# Patient Record
Sex: FEMALE | Race: WHITE | NOT HISPANIC OR LATINO | Employment: UNEMPLOYED | URBAN - METROPOLITAN AREA
[De-identification: names, ages, dates, MRNs, and addresses within clinical notes are randomized per-mention and may not be internally consistent; named-entity substitution may affect disease eponyms.]

---

## 2021-05-27 ENCOUNTER — RECORDS - HEALTHEAST (OUTPATIENT)
Dept: ADMINISTRATIVE | Facility: CLINIC | Age: 61
End: 2021-05-27

## 2021-05-29 ENCOUNTER — RECORDS - HEALTHEAST (OUTPATIENT)
Dept: ADMINISTRATIVE | Facility: CLINIC | Age: 61
End: 2021-05-29

## 2022-10-18 ENCOUNTER — APPOINTMENT (OUTPATIENT)
Dept: CT IMAGING | Facility: HOSPITAL | Age: 62
End: 2022-10-18
Attending: EMERGENCY MEDICINE
Payer: COMMERCIAL

## 2022-10-18 ENCOUNTER — HOSPITAL ENCOUNTER (EMERGENCY)
Facility: HOSPITAL | Age: 62
Discharge: HOME | End: 2022-10-18
Attending: EMERGENCY MEDICINE | Admitting: EMERGENCY MEDICINE
Payer: COMMERCIAL

## 2022-10-18 VITALS
BODY MASS INDEX: 18.73 KG/M2 | DIASTOLIC BLOOD PRESSURE: 64 MMHG | OXYGEN SATURATION: 99 % | TEMPERATURE: 97.9 F | HEIGHT: 63 IN | WEIGHT: 105.7 LBS | RESPIRATION RATE: 18 BRPM | HEART RATE: 70 BPM | SYSTOLIC BLOOD PRESSURE: 140 MMHG

## 2022-10-18 DIAGNOSIS — E87.6 HYPOKALEMIA: ICD-10-CM

## 2022-10-18 DIAGNOSIS — K62.89 PROCTITIS: ICD-10-CM

## 2022-10-18 PROBLEM — F19.11 OTHER, MIXED, OR UNSPECIFIED NONDEPENDENT DRUG ABUSE, IN REMISSION: Status: ACTIVE | Noted: 2022-10-18

## 2022-10-18 LAB
ALBUMIN SERPL BCG-MCNC: 4.8 G/DL (ref 3.5–5.2)
ALP SERPL-CCNC: 113 U/L (ref 35–104)
ALT SERPL W P-5'-P-CCNC: 35 U/L (ref 10–35)
ANION GAP SERPL CALCULATED.3IONS-SCNC: 16 MMOL/L (ref 7–15)
AST SERPL W P-5'-P-CCNC: 48 U/L (ref 10–35)
BASOPHILS # BLD AUTO: 0.1 10E3/UL (ref 0–0.2)
BASOPHILS NFR BLD AUTO: 2 %
BILIRUB DIRECT SERPL-MCNC: <0.2 MG/DL (ref 0–0.3)
BILIRUB SERPL-MCNC: 0.6 MG/DL
BUN SERPL-MCNC: 10.4 MG/DL (ref 8–23)
C DIFF TOX B STL QL: NEGATIVE
CALCIUM SERPL-MCNC: 9.9 MG/DL (ref 8.8–10.2)
CHLORIDE SERPL-SCNC: 104 MMOL/L (ref 98–107)
CREAT SERPL-MCNC: 0.64 MG/DL (ref 0.51–0.95)
CRP SERPL-MCNC: 5.5 MG/L
DEPRECATED HCO3 PLAS-SCNC: 17 MMOL/L (ref 22–29)
EOSINOPHIL # BLD AUTO: 0.2 10E3/UL (ref 0–0.7)
EOSINOPHIL NFR BLD AUTO: 3 %
ERYTHROCYTE [DISTWIDTH] IN BLOOD BY AUTOMATED COUNT: 14.2 % (ref 10–15)
GFR SERPL CREATININE-BSD FRML MDRD: >90 ML/MIN/1.73M2
GLUCOSE SERPL-MCNC: 153 MG/DL (ref 70–99)
HCT VFR BLD AUTO: 43 % (ref 35–47)
HGB BLD-MCNC: 15.6 G/DL (ref 11.7–15.7)
IMM GRANULOCYTES # BLD: 0 10E3/UL
IMM GRANULOCYTES NFR BLD: 0 %
LIPASE SERPL-CCNC: 44 U/L (ref 13–60)
LYMPHOCYTES # BLD AUTO: 1.3 10E3/UL (ref 0.8–5.3)
LYMPHOCYTES NFR BLD AUTO: 18 %
MAGNESIUM SERPL-MCNC: 1.9 MG/DL (ref 1.7–2.3)
MCH RBC QN AUTO: 31.8 PG (ref 26.5–33)
MCHC RBC AUTO-ENTMCNC: 36.3 G/DL (ref 31.5–36.5)
MCV RBC AUTO: 88 FL (ref 78–100)
MONOCYTES # BLD AUTO: 0.6 10E3/UL (ref 0–1.3)
MONOCYTES NFR BLD AUTO: 9 %
NEUTROPHILS # BLD AUTO: 4.6 10E3/UL (ref 1.6–8.3)
NEUTROPHILS NFR BLD AUTO: 68 %
NRBC # BLD AUTO: 0 10E3/UL
NRBC BLD AUTO-RTO: 0 /100
PLATELET # BLD AUTO: 355 10E3/UL (ref 150–450)
POTASSIUM SERPL-SCNC: 3.2 MMOL/L (ref 3.4–5.3)
PROT SERPL-MCNC: 7.7 G/DL (ref 6.4–8.3)
RBC # BLD AUTO: 4.91 10E6/UL (ref 3.8–5.2)
SODIUM SERPL-SCNC: 137 MMOL/L (ref 136–145)
WBC # BLD AUTO: 6.8 10E3/UL (ref 4–11)

## 2022-10-18 PROCEDURE — 250N000011 HC RX IP 250 OP 636: Performed by: EMERGENCY MEDICINE

## 2022-10-18 PROCEDURE — 83735 ASSAY OF MAGNESIUM: CPT | Performed by: EMERGENCY MEDICINE

## 2022-10-18 PROCEDURE — 83690 ASSAY OF LIPASE: CPT | Performed by: EMERGENCY MEDICINE

## 2022-10-18 PROCEDURE — 99285 EMERGENCY DEPT VISIT HI MDM: CPT | Mod: 25

## 2022-10-18 PROCEDURE — 96375 TX/PRO/DX INJ NEW DRUG ADDON: CPT

## 2022-10-18 PROCEDURE — 96361 HYDRATE IV INFUSION ADD-ON: CPT

## 2022-10-18 PROCEDURE — 82248 BILIRUBIN DIRECT: CPT | Performed by: EMERGENCY MEDICINE

## 2022-10-18 PROCEDURE — 36415 COLL VENOUS BLD VENIPUNCTURE: CPT | Performed by: EMERGENCY MEDICINE

## 2022-10-18 PROCEDURE — 74177 CT ABD & PELVIS W/CONTRAST: CPT

## 2022-10-18 PROCEDURE — 85025 COMPLETE CBC W/AUTO DIFF WBC: CPT | Performed by: EMERGENCY MEDICINE

## 2022-10-18 PROCEDURE — 258N000003 HC RX IP 258 OP 636: Performed by: EMERGENCY MEDICINE

## 2022-10-18 PROCEDURE — 96374 THER/PROPH/DIAG INJ IV PUSH: CPT

## 2022-10-18 PROCEDURE — 87493 C DIFF AMPLIFIED PROBE: CPT | Performed by: EMERGENCY MEDICINE

## 2022-10-18 PROCEDURE — 86140 C-REACTIVE PROTEIN: CPT | Performed by: EMERGENCY MEDICINE

## 2022-10-18 PROCEDURE — 250N000013 HC RX MED GY IP 250 OP 250 PS 637: Performed by: EMERGENCY MEDICINE

## 2022-10-18 RX ORDER — ONDANSETRON 2 MG/ML
4 INJECTION INTRAMUSCULAR; INTRAVENOUS ONCE
Status: COMPLETED | OUTPATIENT
Start: 2022-10-18 | End: 2022-10-18

## 2022-10-18 RX ORDER — ONDANSETRON 4 MG/1
4 TABLET, ORALLY DISINTEGRATING ORAL EVERY 8 HOURS PRN
Qty: 10 TABLET | Refills: 0 | Status: SHIPPED | OUTPATIENT
Start: 2022-10-18 | End: 2022-10-21

## 2022-10-18 RX ORDER — IOPAMIDOL 755 MG/ML
100 INJECTION, SOLUTION INTRAVASCULAR ONCE
Status: COMPLETED | OUTPATIENT
Start: 2022-10-18 | End: 2022-10-18

## 2022-10-18 RX ORDER — KETOROLAC TROMETHAMINE 15 MG/ML
15 INJECTION, SOLUTION INTRAMUSCULAR; INTRAVENOUS ONCE
Status: COMPLETED | OUTPATIENT
Start: 2022-10-18 | End: 2022-10-18

## 2022-10-18 RX ORDER — POTASSIUM CHLORIDE 1.5 G/1.58G
40 POWDER, FOR SOLUTION ORAL ONCE
Status: COMPLETED | OUTPATIENT
Start: 2022-10-18 | End: 2022-10-18

## 2022-10-18 RX ADMIN — ONDANSETRON 4 MG: 2 INJECTION INTRAMUSCULAR; INTRAVENOUS at 11:51

## 2022-10-18 RX ADMIN — IOPAMIDOL 100 ML: 755 INJECTION, SOLUTION INTRAVENOUS at 13:36

## 2022-10-18 RX ADMIN — SODIUM CHLORIDE, POTASSIUM CHLORIDE, SODIUM LACTATE AND CALCIUM CHLORIDE 1000 ML: 600; 310; 30; 20 INJECTION, SOLUTION INTRAVENOUS at 11:49

## 2022-10-18 RX ADMIN — POTASSIUM CHLORIDE 40 MEQ: 1.5 POWDER, FOR SOLUTION ORAL at 14:13

## 2022-10-18 RX ADMIN — KETOROLAC TROMETHAMINE 15 MG: 15 INJECTION, SOLUTION INTRAMUSCULAR; INTRAVENOUS at 11:52

## 2022-10-18 ASSESSMENT — ENCOUNTER SYMPTOMS
DYSURIA: 0
ABDOMINAL PAIN: 1
DIARRHEA: 1
NAUSEA: 1
WOUND: 0
FATIGUE: 1
BACK PAIN: 1
WEAKNESS: 1
BRUISES/BLEEDS EASILY: 0
VOMITING: 0
CONFUSION: 0

## 2022-10-18 ASSESSMENT — ACTIVITIES OF DAILY LIVING (ADL): ADLS_ACUITY_SCORE: 33

## 2022-10-18 NOTE — ED NOTES
"ED Triage Provider Note  [facility]  Encounter Date: Oct 18, 2022      The patient was seen in triage to expedite ED workup.     History:  No chief complaint on file.    Khushboo Kerr is a 62 year old female with history of Crohn's (on mesalamine) presenting for evaluation of 2 weeks of bloodless diarrhea. Told by GI clinic to present. Reports lower abdominal pain as well; currently 6/10. States she does not like taking steroids because \"they put me into a bad depression\".    Review of Systems:  - Positive for diarrhea, nausea, lower abdominal pain  - Negative for vomiting, chest pain, shortness of breath    Vitals:  There were no vitals taken for this visit.    Brief Exam:  steady unaccompanied gait, no distress, normal work of breathing, dry mucous membranes, mild lower abdominal tenderness with no rebound or guarding, no peritoneal signs    Medical Decision Making:  Patient arriving to the ED with problem as above. A medical screening exam was performed. Orders initiated from triage to expedite ED workup.    Appears dehydrated. Will obtain CT given pain/tenderness along with typical blood work. Will give IVF, Zofran, Toradol in the meantime. Patient comfortable with this plan; no further questions at this time.    The patient is appropriate to wait in triage until ED room available.      Orders Placed This Encounter   Procedures     Basic metabolic panel     Hepatic function panel     Magnesium     CRP inflammation     Lipase     Peripheral IV catheter     CBC with platelets differential           Pepito Vazquez MD  10/18/22  Emergency Medicine  [facility] EMERGENCY DEPARTMENT  [address] [place] [id number] [phone]  Dept: [phone]     Pepito Vazquez MD  10/18/22 1122    "

## 2022-10-18 NOTE — ED TRIAGE NOTES
Has Crohn's and has been having diarrhea in the last 2 mos and worsein the last week.  No blood in stool.  Nausea     Triage Assessment     Row Name 10/18/22 1119       Triage Assessment (Adult)    Airway WDL WDL       Respiratory WDL    Respiratory WDL WDL       Skin Circulation/Temperature WDL    Skin Circulation/Temperature WDL WDL       Cognitive/Neuro/Behavioral WDL    Cognitive/Neuro/Behavioral WDL WDL

## 2022-10-18 NOTE — ED PROVIDER NOTES
EMERGENCY DEPARTMENT ENCOUNTER      NAME: Khushboo Kerr  AGE: 62 year old female  YOB: 1960  MRN: [id number]  EVALUATION DATE & TIME: 10/18/2022  2:48 PM    PCP: [facility]    ED PROVIDER: Mk Beltran MD        Chief Complaint   Patient presents with     Diarrhea     Nausea         FINAL IMPRESSION:  1. Proctitis    2. Hypokalemia          ED COURSE & MEDICAL DECISION MAKING:    Pertinent Labs & Imaging studies reviewed. (See chart for details)  62 year old female presents to the Emergency Department for evaluation of 2 months of diarrhea    Medical Decision Making    Supplemental history from: Caregiver    External Record(s) Reviewed: Outpatient Record    Differential Diagnosis: See MDM charting for differential considered.     I performed an independent interpretation of the: N/A    Discussed with radiology regarding test interpretation: N/A    Discussion of management with another provider: Gastroenterology    The following testing was considered but ultimately not selected: None    I considered prescription management with: Antibiotic    The patient's care impacted: None    Consideration of Admission/Observation: Admission/Observation considered but ultimately discharged: pt feeling better and will call her GI tomorow.    Care significantly affected by Social Determinants of Health including: N/A    ED Course as of 10/18/22 1613   Tue Oct 18, 2022   1551 I spoke with Dr. Saurav AMANDA and she wants a C Diff test 1st either in ER or at [facility] tomorrow.    1609 Patient presents with 2 months of diarrhea.  Differential includes C. difficile, colitis, food intolerances, other infectious or inflammatory causes of colitis   1610 Patient was in touch with her GI doctor today and her GI doctor ordered stool testing but patient decided to come to the ER because she was feeling worse   1610 Given Toradol and IV fluids in triage and is now feeling better.  Elevated CRP with normal white blood cell count  "  1610 No guarding or rigidity on examination or pain out of portion to exam to suggest ischemic colitis   1610 CT shows evidence of proctitis   1610 Discussed with patient's GI doctor and patient wants a C. difficile test done prior to starting steroids   1610 Patient will call her GI doctor tomorrow regarding her C. difficile testing and they can talk about treatment options for either C. difficile or other colitis   1611 We will send patient home on Zofran for nausea.  Offered oxycodone for pain which patient is declining   1611 Patient does not want to be admitted to the hospital   1611 Potassium(!): 3.2  Given oral potassium   1611 Anion Gap(!): 16   1611 Carbon Dioxide (CO2)(!): 17  Consistent with diarrheal illness   1611 Magnesium: 1.9       3:33 PM I met the patient and performed my initial interview and exam.    3:52 PM I spoke with Dr. Mg, [facility].   4:05 PM I spoke about plan for discharge.     At the conclusion of the encounter I discussed the results of all of the tests and the disposition. The questions were answered. The patient or family acknowledged understanding and was agreeable with the care plan.           MEDICATIONS GIVEN IN THE EMERGENCY:  Medications   lactated ringers BOLUS 1,000 mL (0 mLs Intravenous Stopped 10/18/22 1311)   ondansetron (ZOFRAN) injection 4 mg (4 mg Intravenous Given 10/18/22 1151)   ketorolac (TORADOL) injection 15 mg (15 mg Intravenous Given 10/18/22 1152)   potassium chloride (KLOR-CON) Packet 40 mEq (40 mEq Oral Given 10/18/22 1413)   iopamidol (ISOVUE-370) solution 100 mL (100 mLs Intravenous Given 10/18/22 1336)       NEW PRESCRIPTIONS STARTED AT TODAY'S ER VISIT  New Prescriptions    ONDANSETRON (ZOFRAN ODT) 4 MG ODT TAB    Take 1 tablet (4 mg) by mouth every 8 hours as needed for nausea          =================================================================    HPI    Triage note  \"  Has Crohn's and has been having diarrhea in the last 2 mos and " "worsein the last week.  No blood in stool.  Nausea     Triage Assessment     Row Name 10/18/22 1119       Triage Assessment (Adult)    Airway WDL WDL       Respiratory WDL    Respiratory WDL WDL       Skin Circulation/Temperature WDL    Skin Circulation/Temperature WDL WDL       Cognitive/Neuro/Behavioral WDL    Cognitive/Neuro/Behavioral WDL WDL              \"      Patient information was obtained from: Patient     Use of : N/A       Khushboo Kerr is a 62 year old female with a pertinent history of adenomatous polyp of colon, lymphocytic colitis, and regional enteritis who presents to this ED by walk in for evaluation of diarrhea and nausea.    Patient reports that she has had abdominal cramping, diarrhea, nausea, thigh and back pain for the last two months. In the last 3-4 days she has lost 10 pounds prompting an ED visit. Patient has felt shaky, weak, and fatigued today. Has had dry heaves but no vomiting. Patient does have a history of lymphocytic colitis and follows with [facility] for this. Patient takes Celexa currently and other diarrhea medications. She does not taken any anti nausea medication. Patient denies any other complaints at this time.       REVIEW OF SYSTEMS   Review of Systems   Constitutional: Positive for fatigue.   HENT: Negative for drooling.    Gastrointestinal: Positive for abdominal pain, diarrhea and nausea. Negative for vomiting.   Endocrine: Negative for polyuria.   Genitourinary: Negative for dysuria.   Musculoskeletal: Positive for back pain (with thigh pain).   Skin: Negative for wound.   Neurological: Positive for weakness (generalized).   Hematological: Does not bruise/bleed easily.   Psychiatric/Behavioral: Negative for confusion.        PAST MEDICAL HISTORY:  History reviewed. No pertinent past medical history.    PAST SURGICAL HISTORY:  History reviewed. No pertinent surgical history.        CURRENT MEDICATIONS:    ondansetron (ZOFRAN ODT) 4 MG ODT " "tab        ALLERGIES:  Allergies   Allergen Reactions     Cephalosporins Unknown     itching     Haemophilus Influenzae Swelling     swelling at the site.     Ceftin [Cefuroxime] Rash     Sulfa (Sulfonamide Antibiotics) [Sulfa Drugs] Rash       FAMILY HISTORY:  No family history on file.    SOCIAL HISTORY:   Social History     Socioeconomic History     Marital status:    Social History Narrative    Patient is        VITALS:  BP (!) 140/64   Pulse 70   Temp 97.9  F (36.6  C) (Oral)   Resp 18   Ht 1.6 m (5' 3\")   Wt 47.9 kg (105 lb 11.2 oz)   SpO2 99%   BMI 18.72 kg/m      PHYSICAL EXAM      Vitals: BP (!) 140/64   Pulse 70   Temp 97.9  F (36.6  C) (Oral)   Resp 18   Ht 1.6 m (5' 3\")   Wt 47.9 kg (105 lb 11.2 oz)   SpO2 99%   BMI 18.72 kg/m    General: Appears in no acute distress, awake, alert, interactive. Slightly irritable.   Eyes: Conjunctivae non-injected. Sclera anicteric.  HENT: Atraumatic.  Neck: Supple.  Respiratory/Chest: Respiration unlabored.  Abdomen: non distended. No abdominal tenderness. Normal bowel sounds.   Musculoskeletal: Normal extremities. No edema or erythema.  Skin: Normal color. No rash or diaphoresis.  Neurologic: Face symmetric, moves all extremities spontaneously. Speech clear.  Psychiatric: Oriented to person, place, and time. Affect appropriate.       LAB:  All pertinent labs reviewed and interpreted.  Results for orders placed or performed during the hospital encounter of 10/18/22   CT Abdomen Pelvis w Contrast    Impression    IMPRESSION:   1.  Mild mural hyperenhancement of the rectum suggesting infectious or inflammatory proctitis. Increased fluid throughout the colon indicating diarrhea.    2.  No small bowel active inflammation.    3.  No evidence of bowel obstruction.   Basic metabolic panel   Result Value Ref Range    Sodium 137 136 - 145 mmol/L    Potassium 3.2 (L) 3.4 - 5.3 mmol/L    Chloride 104 98 - 107 mmol/L    Carbon Dioxide (CO2) 17 (L) 22 - " 29 mmol/L    Anion Gap 16 (H) 7 - 15 mmol/L    Urea Nitrogen 10.4 8.0 - 23.0 mg/dL    Creatinine 0.64 0.51 - 0.95 mg/dL    Calcium 9.9 8.8 - 10.2 mg/dL    Glucose 153 (H) 70 - 99 mg/dL    GFR Estimate >90 >60 mL/min/1.73m2   Hepatic function panel   Result Value Ref Range    Protein Total 7.7 6.4 - 8.3 g/dL    Albumin 4.8 3.5 - 5.2 g/dL    Bilirubin Total 0.6 <=1.2 mg/dL    Alkaline Phosphatase 113 (H) 35 - 104 U/L    AST 48 (H) 10 - 35 U/L    ALT 35 10 - 35 U/L    Bilirubin Direct <0.20 0.00 - 0.30 mg/dL   Result Value Ref Range    Magnesium 1.9 1.7 - 2.3 mg/dL   Result Value Ref Range    CRP Inflammation 5.50 (H) <5.00 mg/L   Result Value Ref Range    Lipase 44 13 - 60 U/L   CBC with platelets and differential   Result Value Ref Range    WBC Count 6.8 4.0 - 11.0 10e3/uL    RBC Count 4.91 3.80 - 5.20 10e6/uL    Hemoglobin 15.6 11.7 - 15.7 g/dL    Hematocrit 43.0 35.0 - 47.0 %    MCV 88 78 - 100 fL    MCH 31.8 26.5 - 33.0 pg    MCHC 36.3 31.5 - 36.5 g/dL    RDW 14.2 10.0 - 15.0 %    Platelet Count 355 150 - 450 10e3/uL    % Neutrophils 68 %    % Lymphocytes 18 %    % Monocytes 9 %    % Eosinophils 3 %    % Basophils 2 %    % Immature Granulocytes 0 %    NRBCs per 100 WBC 0 <1 /100    Absolute Neutrophils 4.6 1.6 - 8.3 10e3/uL    Absolute Lymphocytes 1.3 0.8 - 5.3 10e3/uL    Absolute Monocytes 0.6 0.0 - 1.3 10e3/uL    Absolute Eosinophils 0.2 0.0 - 0.7 10e3/uL    Absolute Basophils 0.1 0.0 - 0.2 10e3/uL    Absolute Immature Granulocytes 0.0 <=0.4 10e3/uL    Absolute NRBCs 0.0 10e3/uL       RADIOLOGY:  Reviewed all pertinent imaging. Please see official radiology report.  CT Abdomen Pelvis w Contrast   Final Result   IMPRESSION:    1.  Mild mural hyperenhancement of the rectum suggesting infectious or inflammatory proctitis. Increased fluid throughout the colon indicating diarrhea.      2.  No small bowel active inflammation.      3.  No evidence of bowel obstruction.          Domingo CIFUENTES, am serving as a scribe  to document services personally performed by John Beltran MD based on my observation and the provider's statements to me. I, Dr. John Beltran, attest that Domingo Keith is acting in a scribe capacity, has observed my performance of the services and has documented them in accordance with my direction.    John Beltran MD  Emergency Medicine  [facility] EMERGENCY DEPARTMENT  [address] [place] [id number]  [phone]     John Beltran MD  10/18/22 [id number]

## 2022-10-18 NOTE — DISCHARGE INSTRUCTIONS
As we discussed I spoke with Dr. Hope Mg, [facility].  She wants to see the results of stool C. difficile testing prior to starting steroids.  I recommend you give her clinic a call tomorrow because the C. difficile test results should be back.  You can use Zofran every 8 hours needed for nausea

## 2022-10-18 NOTE — ED NOTES
Patient and family member reviewed discharge.  Discussed printed discharge information.  Follow-up appts reviewed.   What s/s warrant return to the ER.  Prescriptions and how to take them.  Importance of social distancing, good hand hygiene, and wearing a mask when in public spaces.

## 2023-02-05 ENCOUNTER — HEALTH MAINTENANCE LETTER (OUTPATIENT)
Age: 63
End: 2023-02-05

## 2024-03-03 ENCOUNTER — HEALTH MAINTENANCE LETTER (OUTPATIENT)
Age: 64
End: 2024-03-03

## 2025-02-15 ENCOUNTER — HEALTH MAINTENANCE LETTER (OUTPATIENT)
Age: 65
End: 2025-02-15

## 2025-03-15 ENCOUNTER — HEALTH MAINTENANCE LETTER (OUTPATIENT)
Age: 65
End: 2025-03-15